# Patient Record
Sex: FEMALE | Race: WHITE | NOT HISPANIC OR LATINO | Employment: FULL TIME | URBAN - METROPOLITAN AREA
[De-identification: names, ages, dates, MRNs, and addresses within clinical notes are randomized per-mention and may not be internally consistent; named-entity substitution may affect disease eponyms.]

---

## 2018-01-11 NOTE — MISCELLANEOUS
Active Problems    1  Acute bronchitis (466 0) (J20 9)   2  Acute maxillary sinusitis (461 0) (J01 00)   3  Cigarette smoker (305 1) (F17 210)   4  Duodenal ulcer (532 90) (K26 9)   5  Dysfunction of eustachian tube, unspecified laterality (381 81) (H69 80)   6  Encounter for screening colonoscopy (V76 51) (Z12 11)   7  Encounter for screening mammogram for malignant neoplasm of breast (V76 12)   (Z12 31)   8  Gastritis (535 50) (K29 70)   9  Hyperlipidemia (272 4) (E78 5)   10  Nicotine dependence (305 1) (F17 200)   11  Screening for osteoporosis (V82 81) (U31 648)    Current Meds   1  Azithromycin 250 MG Oral Tablet; 2 tabs by mouth today then 1 tab by mouth daily for   the next 4 days; Therapy: 90ETO7080 to (Last Rx:05Nqk0731)  Requested for: 38RLJ7339 Ordered   2  Fluticasone Propionate 50 MCG/ACT Nasal Suspension; 2 sprays in each nostril at   bedtime; Therapy: 00RUN5289 to (Last Rx:37Bmd2448)  Requested for: 11LPT3158 Ordered    Allergies    1  Codeine Derivatives    Signatures   Electronically signed by :  AMADA Mack; Oct 25 2016  4:40PM EST                       (Author)

## 2018-01-15 NOTE — MISCELLANEOUS
Message  Return to work or school:   [employer] Kussmaul is under my professional care  She was seen in my office on 1/15/16    She is not able to return to work until 1/19/16      Michelle Madrid  Signatures   Electronically signed by :  AMADA Madrid; Germain 15 2016 11:03AM EST                       (Author)

## 2018-01-18 NOTE — PROGRESS NOTES
Assessment    1  Acute maxillary sinusitis (461 0) (J01 00)   2  Nicotine dependence (305 1) (F17 200)   3  Hyperlipidemia (272 4) (E78 5)    Plan  Acute maxillary sinusitis    · Amoxicillin 500 MG Oral Capsule; TAKE 2 CAPSULES TWICE DAILY UNTIL GONE   · Fluticasone Propionate 50 MCG/ACT Nasal Suspension; 2 sprays in each nostril at  bedtime   · Follow Up if Not Better Evaluation and Treatment  Follow-up  Status: Complete  Done:  03BBS4758   · Apply warm moist compresses to the affected area 3 times a day for 5 minutes ;  Status:Complete;   Done: 86SOR0848   · Drink at least 6 glasses of water or juice a day ; Status:Complete;   Done: 19IOF1957   · How to use a nasal spray ; Status:Complete;   Done: 26YNT5983   · Taking a hot steamy shower may help your condition ; Status:Complete;   Done:  31IVL8682   · Call [phone] if: The sinus pain is not better in 1 week ; Status:Complete;   Done:  86DPU7904   · Call [phone] if: The symptoms come back after the medications are finished ;  Status:Complete;   Done: 68CFC7172   · Call [phone] if: You start vomiting ; Status:Complete;   Done: 75LCY5415   · Call [phone] if: Your sinus pain is worse ; Status:Complete;   Done: 98LXU2744   · Call [phone] if: Your temperature is higher than 101F ; Status:Complete;   Done:  34KCI1949   · Seek Immediate Medical Attention if: You have a fever, headache, and vomiting, or have  a stiff neck ; Status:Complete;   Done: 58XUV1239   · Seek Immediate Medical Attention if: You have a severe headache that will not go away ;  Status:Complete;   Done: 56ISZ1820   · Seek Immediate Medical Attention if: You have signs of infection in or around the affected  area ; Status:Complete;   Done: 22YPR3595  Hyperlipidemia    · (1) CBC/PLT/DIFF; Status:Active; Requested for:15Jan2016;    · (1) COMPREHENSIVE METABOLIC PANEL; Status:Active;  Requested for:15Jan2016;    · (1) LIPID PANEL FASTING W DIRECT LDL REFLEX; Status:Active; Requested  for:15Jan2016;    · (1) T4, FREE; Status:Active; Requested for:15Jan2016;    · (1) TSH; Status:Active; Requested for:15Jan2016;    · (1) VITAMIN D 25-HYDROXY; Status:Active; Requested for:15Jan2016;   Nicotine dependence    · You need to quit smoking ; Status:Complete;   Done: 84LAI6468    Discussion/Summary    Acute sinusitis - given persistence/worsening, will issue antibiotic and nasal steroid, recommended she use mucinex and OTC cough med in addition as needed, urged smoking cessation    Hyperlipids - urged she schedule f/u appt to review fasting labs she is due for, lab orders given but defers appt at this time     History of Present Illness  HPI: States she and her son have been sick back and forth for past 1 5-2 weeks  She has been trying to doctor herself w/many OTC meds  Yesterday she started with head pounding on bending and increase in nasal drainage  Drainage has been yellow/green  Has headache over her eyes and has started with an awful cough w/occ SOB/wheeze  Slept all night last night  She still smokes  Insurance declined chantix so she tried the patch which made her break out  Review of Systems    Constitutional: feeling poorly, but no fever  ENT: nasal discharge, but no earache and no sore throat  Respiratory: shortness of breath, cough and wheezing  Active Problems    1  Cigarette smoker (305 1) (F17 210)   2  Duodenal ulcer (532 90) (K26 9)   3  Dysfunction of eustachian tube, unspecified laterality (381 81) (H69 80)   4  Encounter for screening colonoscopy (V76 51) (Z12 11)   5  Encounter for screening mammogram for malignant neoplasm of breast (V76 12)   (Z12 31)   6  Gastritis (535 50) (K29 70)   7  Hyperlipidemia (272 4) (E78 5)   8  Nicotine dependence (305 1) (F17 200)   9  Screening for osteoporosis (V82 81) (N26 381)    Past Medical History    1  History of Abdominal pain, epigastric (789 06) (R10 13)   2   History of Abdominal pain, RUQ (789 01) (R10 11)   3  History of Acute maxillary sinusitis, recurrence not specified (461 0) (J01 00)   4  History of abnormal weight loss (V13 89) (Z87 898)   5  History of acute bronchitis (V12 69) (Z87 09)   6  History of low back pain (V13 59) (Z87 39)   7  History of Microscopic hematuria (599 72) (R31 2)  Active Problems And Past Medical History Reviewed: The active problems and past medical history were reviewed and updated today  Family History    1  Family history of Dementia   2  Family history of Duodenal Ulcer   3  Family history of Mother  At Age 72    1  Family history of Colon Cancer (V16 0)   5  Family history of Father  At Age 73   7  Family history of Lung Cancer (V16 1)    7  Family history of factor V Leiden deficiency (V18 3) (Z83 2)    Social History    · Caffeine Use   · Cigarette smoker (305 1) (F17 210)   · Current Every Day Smoker (305 1)    Surgical History    1  History of Complete Colonoscopy   2  History of Diagnostic Esophagogastroduodenoscopy   3  History of Diagnostic Esophagogastroduodenoscopy    Current Meds   1  Simvastatin 20 MG Oral Tablet; Take 1 tablet by mouth at bedtime; Therapy: 94Fxz0990 to (247 9296)  Requested for: 29Rmb1153; Last   Rx:02Zma1259 Ordered    The medication list was reviewed and updated today  Allergies    1  Codeine Derivatives    Vitals   Recorded: 04YBF2000 10:41AM   Temperature 98 4 F   Heart Rate 88   Respiration 14   Systolic 952   Diastolic 80   Height 5 ft 4 5 in   Weight 122 lb    BMI Calculated 20 62   BSA Calculated 1 6     Physical Exam    Constitutional   General appearance: Abnormal   appears tired  Eyes   Conjunctiva and lids: No swelling, erythema or discharge  Ears, Nose, Mouth, and Throat   External inspection of ears and nose: Normal     Otoscopic examination: Tympanic membranes translucent with normal light reflex  Canals patent without erythema  bilat frontal/maxillary sinus pain     Oropharynx: Normal with no erythema, edema, exudate or lesions  Pulmonary   Respiratory effort: No increased work of breathing or signs of respiratory distress  no cough  Auscultation of lungs: Abnormal   Auscultation of the lungs revealed decreased breath sounds diffusely  Cardiovascular   Palpation of heart: Normal PMI, no thrills  Auscultation of heart: Normal rate and rhythm, normal S1 and S2, without murmurs  Lymphatic   Palpation of lymph nodes in neck: No lymphadenopathy  Psychiatric   Mood and affect: Normal          Results/Data  PHQ-2 Adult Depression Screening 93HLQ6149 10:44AM User, Ahs     Test Name Result Flag Reference   PHQ-2 Adult Depression Score 1     Q1: 1, Q2: 0   PHQ-2 Adult Depression Screening Negative         Attending Note  Collaborating Physician Note: Collaborating Note: I agree with the Advanced Practitioner note  Signatures   Electronically signed by :  Stacy Rodriguez; Germain 15 2016  6:00PM EST                       (Author)    Electronically signed by : Lucy Contreras MD; Jan 17 2016  8:04PM EST                       (Co-author)

## 2018-12-12 ENCOUNTER — HOSPITAL ENCOUNTER (OUTPATIENT)
Dept: RADIOLOGY | Facility: HOSPITAL | Age: 58
Discharge: HOME | End: 2018-12-12
Attending: PODIATRIST
Payer: COMMERCIAL

## 2018-12-12 ENCOUNTER — TRANSCRIBE ORDERS (OUTPATIENT)
Dept: ADMINISTRATIVE | Facility: HOSPITAL | Age: 58
End: 2018-12-12

## 2018-12-12 DIAGNOSIS — M79.604 PAIN OF RIGHT LOWER EXTREMITY: Primary | ICD-10-CM

## 2018-12-12 DIAGNOSIS — M79.604 PAIN OF RIGHT LOWER EXTREMITY: ICD-10-CM

## 2018-12-12 PROCEDURE — 73630 X-RAY EXAM OF FOOT: CPT

## 2019-04-22 DIAGNOSIS — Z12.39 ENCOUNTER FOR SCREENING FOR MALIGNANT NEOPLASM OF BREAST: Primary | ICD-10-CM

## 2019-08-26 ENCOUNTER — TELEPHONE (OUTPATIENT)
Dept: FAMILY MEDICINE CLINIC | Facility: HOSPITAL | Age: 59
End: 2019-08-26

## 2019-09-09 ENCOUNTER — TELEPHONE (OUTPATIENT)
Dept: FAMILY MEDICINE CLINIC | Facility: HOSPITAL | Age: 59
End: 2019-09-09

## 2019-10-21 ENCOUNTER — OFFICE VISIT (OUTPATIENT)
Dept: FAMILY MEDICINE CLINIC | Facility: HOSPITAL | Age: 59
End: 2019-10-21
Payer: COMMERCIAL

## 2019-10-21 VITALS
OXYGEN SATURATION: 99 % | SYSTOLIC BLOOD PRESSURE: 140 MMHG | HEART RATE: 100 BPM | WEIGHT: 135.2 LBS | HEIGHT: 64 IN | TEMPERATURE: 99.5 F | DIASTOLIC BLOOD PRESSURE: 80 MMHG | BODY MASS INDEX: 23.08 KG/M2

## 2019-10-21 DIAGNOSIS — R03.0 BORDERLINE BLOOD PRESSURE: ICD-10-CM

## 2019-10-21 DIAGNOSIS — Z13.220 SCREENING CHOLESTEROL LEVEL: ICD-10-CM

## 2019-10-21 DIAGNOSIS — Z13.29 THYROID DISORDER SCREENING: ICD-10-CM

## 2019-10-21 DIAGNOSIS — Z12.11 SCREENING FOR COLON CANCER: ICD-10-CM

## 2019-10-21 DIAGNOSIS — Z23 ENCOUNTER FOR IMMUNIZATION: ICD-10-CM

## 2019-10-21 DIAGNOSIS — Z00.00 ANNUAL PHYSICAL EXAM: Primary | ICD-10-CM

## 2019-10-21 DIAGNOSIS — Z80.0 FAMILY HISTORY OF MALIGNANT NEOPLASM OF COLON: ICD-10-CM

## 2019-10-21 PROCEDURE — 99396 PREV VISIT EST AGE 40-64: CPT | Performed by: NURSE PRACTITIONER

## 2019-10-21 NOTE — ASSESSMENT & PLAN NOTE
Briefly discussed/recommended cessation but pt pre-contemplative  Offered further assistance should she desire

## 2019-10-21 NOTE — PROGRESS NOTES
Sesar Garrison MD    NAME: John Peña  AGE: 63 y o  SEX: female  : 1960     DATE: 10/21/2019     Assessment and Plan:     Problem List Items Addressed This Visit        Other    Borderline blood pressure     Advised she manage w/lifestyle through diet and exercise efforts  Return in 1 year for BP check  Relevant Orders    CBC and differential    Comprehensive metabolic panel      Other Visit Diagnoses     Annual physical exam    -  Primary    PE updated, return in 1 year for next; orders given to update fasting labs and will call w/results; flu shot declined; she plans to do mammo in April    Family history of malignant neoplasm of colon        Relevant Orders    Ambulatory referral to Gastroenterology    Screening for colon cancer        she plans to schedule colonoscopy in April    Relevant Orders    Ambulatory referral to Gastroenterology    Screening cholesterol level        Relevant Orders    Lipid panel    Thyroid disorder screening        Relevant Orders    TSH, 3rd generation with Free T4 reflex    Encounter for immunization        Relevant Orders    influenza vaccine, [id number], quadrivalent, recombinant, PF, 0 5 mL, for patients 18 yr+ (FLUBLOK)          Immunizations and preventive care screenings were discussed with patient today  Appropriate education was printed on patient's after visit summary  Counseling:  · Exercise: the importance of regular exercise/physical activity was discussed  Recommend exercise 3-5 times per week for at least 30 minutes  Tobacco Cessation Counseling: Tobacco cessation counseling was provided   The patient is sincerely urged to quit consumption of tobacco  She is not ready to quit tobacco        Return in 1 year (on 10/21/2020) for Annual physical      Chief Complaint:     Chief Complaint   Patient presents with    Annual Exam      History of Present Illness:     Adult Annual Physical   Patient here for a comprehensive physical exam  The patient reports no problems  She knows she is overdue for endoscopy and needs colonoscopy  Will plan to do in April when she has more vacation time  Feeling down now with losing close family friend yesterday  Has a hard time living with   Diet and Physical Activity  · Diet/Nutrition: well balanced diet and low carb diet  · Exercise: walking  Depression Screening  PHQ-9 Depression Screening    PHQ-9:    Frequency of the following problems over the past two weeks:       Little interest or pleasure in doing things:  0 - not at all  Feeling down, depressed, or hopeless:  1 - several days  PHQ-2 Score:  1       General Health  · Sleep: sleeps well  · Hearing: normal - bilateral   · Vision: wears glasses  · Dental: regular dental visits  /GYN Health  · Patient is: postmenopausal  · Last menstrual period: ?  · Contraceptive method: post menopausal      Review of Systems:     Review of Systems   Constitutional: Negative for activity change, appetite change, fatigue and unexpected weight change  HENT: Negative for dental problem, ear pain, hearing loss, sore throat and trouble swallowing  Respiratory: Negative for cough, shortness of breath and wheezing  Cardiovascular: Negative for chest pain and palpitations  Gastrointestinal: Negative for abdominal pain, blood in stool, constipation and diarrhea  Genitourinary: Negative for dysuria  Psychiatric/Behavioral: Positive for dysphoric mood (currently due to grief)  Past Medical History:     Past Medical History:   Diagnosis Date    Abnormal weight loss     Last assessed 10/4/2013     Microscopic hematuria     Last assessed 5/29/2013       Past Surgical History:     Past Surgical History:   Procedure Laterality Date    COLONOSCOPY      Resolved 2/2013     ESOPHAGOGASTRODUODENOSCOPY      Diagnostic   Resolved May 2013       Social History:     Social History Socioeconomic History    Marital status: /Civil Union     Spouse name: None    Number of children: None    Years of education: None    Highest education level: None   Occupational History    None   Social Needs    Financial resource strain: None    Food insecurity:     Worry: None     Inability: None    Transportation needs:     Medical: None     Non-medical: None   Tobacco Use    Smoking status: Current Every Day Smoker    Smokeless tobacco: Never Used   Substance and Sexual Activity    Alcohol use: None    Drug use: None    Sexual activity: None   Lifestyle    Physical activity:     Days per week: None     Minutes per session: None    Stress: None   Relationships    Social connections:     Talks on phone: None     Gets together: None     Attends [employer] service: None     Active member of club or organization: None     Attends meetings of clubs or organizations: None     Relationship status: None    Intimate partner violence:     Fear of current or ex partner: None     Emotionally abused: None     Physically abused: None     Forced sexual activity: None   Other Topics Concern    None   Social History Narrative    Caffeine use       Family History:     Family History   Problem Relation Age of Onset    Dementia Mother     Other Mother         Duodenal ulcer     Colon cancer Father     Lung cancer Father     Factor V Leiden deficiency Sister       Current Medications:     No current outpatient medications on file  No current facility-administered medications for this visit  Allergies: Allergies   Allergen Reactions    Codeine Vomiting      Physical Exam:     /80 (Patient Position: Sitting, Cuff Size: Standard)   Pulse 100   Temp 99 5 °F (37 5 °C) (Tympanic)   Ht 5' 4" (1 626 m)   Wt 61 3 kg (135 lb 3 2 oz)   SpO2 99%   BMI 23 21 kg/m²     Physical Exam   Constitutional: She is oriented to person, place, and time   She appears well-developed and well-nourished  No distress  HENT:   Head: Normocephalic and atraumatic  Right Ear: Hearing and tympanic membrane normal    Left Ear: Hearing and tympanic membrane normal    Nose: Nose normal    Mouth/Throat: Oropharynx is clear and moist  No oropharyngeal exudate  Eyes: Conjunctivae are normal  No scleral icterus  Neck: Normal range of motion  Neck supple  No thyromegaly present  Cardiovascular: Normal rate, regular rhythm and normal heart sounds  No murmur heard  Pulmonary/Chest: Effort normal and breath sounds normal  No respiratory distress  Abdominal: Soft  Bowel sounds are normal  She exhibits no mass  There is no hepatosplenomegaly  There is no tenderness  There is no rebound and no guarding  Musculoskeletal: Normal range of motion  She exhibits no edema  Lymphadenopathy:     She has no cervical adenopathy  Neurological: She is alert and oriented to person, place, and time  No cranial nerve deficit  Skin: Skin is warm and dry  No rash noted  Psychiatric: She has a normal mood and affect  Her behavior is normal  Judgment and thought content normal    Vitals reviewed        Landon Hernandez MD

## 2019-10-21 NOTE — PATIENT INSTRUCTIONS
Wellness Visit for Adults   AMBULATORY CARE:   A wellness visit  is when you see your healthcare provider to get screened for health problems  You can also get advice on how to stay healthy  Write down your questions so you remember to ask them  Ask your healthcare provider how often you should have a wellness visit  What happens at a wellness visit:  Your healthcare provider will ask about your health, and your family history of health problems  This includes high blood pressure, heart disease, and cancer  He or she will ask if you have symptoms that concern you, if you smoke, and about your mood  You may also be asked about your intake of medicines, supplements, food, and alcohol  Any of the following may be done:  · Your weight  will be checked  Your height may also be checked so your body mass index (BMI) can be calculated  Your BMI shows if you are at a healthy weight  · Your blood pressure  and heart rate will be checked  Your temperature may also be checked  · Blood and urine tests  may be done  Blood tests may be done to check your cholesterol levels  Abnormal cholesterol levels increase your risk for heart disease and stroke  You may also need a blood or urine test to check for diabetes if you are at increased risk  Urine tests may be done to look for signs of an infection or kidney disease  · A physical exam  includes checking your heartbeat and lungs with a stethoscope  Your healthcare provider may also check your skin to look for sun damage  · Screening tests  may be recommended  A screening test is done to check for diseases that may not cause symptoms  The screening tests you may need depend on your age, gender, family history, and lifestyle habits  For example, colorectal screening may be recommended if you are 48years old or older  Screening tests you need if you are a woman:   · A Pap smear  is used to screen for cervical cancer   Pap smears are usually done every 3 to 5 years depending on your age  You may need them more often if you have had abnormal Pap smear test results in the past  Ask your healthcare provider how often you should have a Pap smear  · A mammogram  is an x-ray of your breasts to screen for breast cancer  Experts recommend mammograms every 2 years starting at age 48 years  You may need a mammogram at age 52 years or younger if you have an increased risk for breast cancer  Talk to your healthcare provider about when you should start having mammograms and how often you need them  Vaccines you may need:   · Get an influenza vaccine  every year  The influenza vaccine protects you from the flu  Several types of viruses cause the flu  The viruses change over time, so new vaccines are made each year  · Get a tetanus-diphtheria (Td) booster vaccine  every 10 years  This vaccine protects you against tetanus and diphtheria  Tetanus is a severe infection that may cause painful muscle spasms and lockjaw  Diphtheria is a severe bacterial infection that causes a thick covering in the back of your mouth and throat  · Get a human papillomavirus (HPV) vaccine  if you are female and aged 23 to 32 or male 23 to 24 and never received it  This vaccine protects you from HPV infection  HPV is the most common infection spread by sexual contact  HPV may also cause vaginal, penile, and anal cancers  · Get a pneumococcal vaccine  if you are aged 72 years or older  The pneumococcal vaccine is an injection given to protect you from pneumococcal disease  Pneumococcal disease is an infection caused by pneumococcal bacteria  The infection may cause pneumonia, meningitis, or an ear infection  · Get a shingles vaccine  if you are aged 61 or older, even if you have had shingles before  The shingles vaccine is an injection to protect you from the varicella-zoster virus  This is the same virus that causes chickenpox   Shingles is a painful rash that develops in people who had chickenpox or have been exposed to the virus  How to eat healthy:  My Plate is a model for planning healthy meals  It shows the types and amounts of foods that should go on your plate  Fruits and vegetables make up about half of your plate, and grains and protein make up the other half  A serving of dairy is included on the side of your plate  The amount of calories and serving sizes you need depends on your age, gender, weight, and height  Examples of healthy foods are listed below:  · Eat a variety of vegetables  such as dark green, red, and orange vegetables  You can also include canned vegetables low in sodium (salt) and frozen vegetables without added butter or sauces  · Eat a variety of fresh fruits , canned fruit in 100% juice, frozen fruit, and dried fruit  · Include whole grains  At least half of the grains you eat should be whole grains  Examples include whole-wheat bread, wheat pasta, brown rice, and whole-grain cereals such as oatmeal     · Eat a variety of protein foods such as seafood (fish and shellfish), lean meat, and poultry without skin (turkey and chicken)  Examples of lean meats include pork leg, shoulder, or tenderloin, and beef round, sirloin, tenderloin, and extra lean ground beef  Other protein foods include eggs and egg substitutes, beans, peas, soy products, nuts, and seeds  · Choose low-fat dairy products such as skim or 1% milk or low-fat yogurt, cheese, and cottage cheese  · Limit unhealthy fats  such as butter, hard margarine, and shortening  Exercise:  Exercise at least 30 minutes per day on most days of the week  Some examples of exercise include walking, biking, dancing, and swimming  You can also fit in more physical activity by taking the stairs instead of the elevator or parking farther away from stores  Include muscle strengthening activities 2 days each week  Regular exercise provides many health benefits   It helps you manage your weight, and decreases your risk for type 2 diabetes, heart disease, stroke, and high blood pressure  Exercise can also help improve your mood  Ask your healthcare provider about the best exercise plan for you  General health and safety guidelines:   · Do not smoke  Nicotine and other chemicals in cigarettes and cigars can cause lung damage  Ask your healthcare provider for information if you currently smoke and need help to quit  E-cigarettes or smokeless tobacco still contain nicotine  Talk to your healthcare provider before you use these products  · Limit alcohol  A drink of alcohol is 12 ounces of beer, 5 ounces of wine, or 1½ ounces of liquor  · Lose weight, if needed  Being overweight increases your risk of certain health conditions  These include heart disease, high blood pressure, type 2 diabetes, and certain types of cancer  · Protect your skin  Do not sunbathe or use tanning beds  Use sunscreen with a SPF 15 or higher  Apply sunscreen at least 15 minutes before you go outside  Reapply sunscreen every 2 hours  Wear protective clothing, hats, and sunglasses when you are outside  · Drive safely  Always wear your seatbelt  Make sure everyone in your car wears a seatbelt  A seatbelt can save your life if you are in an accident  Do not use your cell phone when you are driving  This could distract you and cause an accident  Pull over if you need to make a call or send a text message  · Practice safe sex  Use latex condoms if are sexually active and have more than one partner  Your healthcare provider may recommend screening tests for sexually transmitted infections (STIs)  · Wear helmets, lifejackets, and protective gear  Always wear a helmet when you ride a bike or motorcycle, go skiing, or play sports that could cause a head injury  Wear protective equipment when you play sports  Wear a lifejacket when you are on a boat or doing water sports    © 2017 [employer] Information is for End User's use only and may not be sold, redistributed or otherwise used for commercial purposes  All illustrations and images included in CareNotes® are the copyrighted property of [employer]  or [employer]  The above information is an  only  It is not intended as medical advice for individual conditions or treatments  Talk to your doctor, nurse or pharmacist before following any medical regimen to see if it is safe and effective for you  Cigarette Smoking and Your Health   AMBULATORY CARE:   Risks to your health if you smoke:  Nicotine and other chemicals found in tobacco damage every cell in your body  Even if you are a light smoker, you have an increased risk for cancer, heart disease, and lung disease  If you are pregnant or have diabetes, smoking increases your risk for complications  Benefits to your health if you stop smoking:   · You decrease respiratory symptoms such as coughing, wheezing, and shortness of breath  · You reduce your risk for cancers of the lung, mouth, throat, kidney, bladder, pancreas, stomach, and cervix  If you already have cancer, you increase the benefits of chemotherapy  You also reduce your risk for cancer returning or a second cancer from developing  · You reduce your risk for heart disease, blood clots, heart attack, and stroke  · You reduce your risk for lung infections, and diseases such as pneumonia, asthma, chronic bronchitis, and emphysema  · Your circulation improves  More oxygen can be delivered to your body  If you have diabetes, you lower your risk for complications, such as kidney, artery, and eye diseases  You also lower your risk for nerve damage  Nerve damage can lead to amputations, poor vision, and blindness  · You improve your body's ability to heal and to fight infections  Benefits to the health of others if you stop smoking:  Tobacco is harmful to nonsmokers who breathe in your secondhand smoke   The following are ways the health of others around you may improve when you stop smoking:  · You lower the risks for lung cancer and heart disease in nonsmoking adults  · If you are pregnant, you lower the risk for miscarriage, early delivery, low birth weight, and stillbirth  You also lower your baby's risk for SIDS, obesity, developmental delay, and neurobehavioral problems, such as ADHD  · If you have children, you lower their risk for ear infections, colds, pneumonia, bronchitis, and asthma  For more information and support to stop smoking:   · [employer]  Phone: 8- 397 - 254-3292  Web Address: www [facility]  Follow up with your healthcare provider as directed:  Write down your questions so you remember to ask them during your visits  © 2017 [address] [facility] Information is for End User's use only and may not be sold, redistributed or otherwise used for commercial purposes  All illustrations and images included in CareNotes® are the copyrighted property of [employer]  or [employer]  The above information is an  only  It is not intended as medical advice for individual conditions or treatments  Talk to your doctor, nurse or pharmacist before following any medical regimen to see if it is safe and effective for you

## 2020-10-26 ENCOUNTER — OFFICE VISIT (OUTPATIENT)
Dept: FAMILY MEDICINE CLINIC | Facility: HOSPITAL | Age: 60
End: 2020-10-26
Payer: COMMERCIAL

## 2020-10-26 VITALS
HEART RATE: 82 BPM | WEIGHT: 132.8 LBS | TEMPERATURE: 98.8 F | HEIGHT: 64 IN | BODY MASS INDEX: 22.67 KG/M2 | DIASTOLIC BLOOD PRESSURE: 78 MMHG | OXYGEN SATURATION: 100 % | SYSTOLIC BLOOD PRESSURE: 124 MMHG

## 2020-10-26 DIAGNOSIS — Z12.31 ENCOUNTER FOR SCREENING MAMMOGRAM FOR MALIGNANT NEOPLASM OF BREAST: ICD-10-CM

## 2020-10-26 DIAGNOSIS — F17.210 CIGARETTE NICOTINE DEPENDENCE WITHOUT COMPLICATION: ICD-10-CM

## 2020-10-26 DIAGNOSIS — Z00.00 ANNUAL PHYSICAL EXAM: Primary | ICD-10-CM

## 2020-10-26 DIAGNOSIS — E78.5 HYPERLIPIDEMIA, UNSPECIFIED HYPERLIPIDEMIA TYPE: ICD-10-CM

## 2020-10-26 DIAGNOSIS — Z12.11 ENCOUNTER FOR SCREENING COLONOSCOPY: ICD-10-CM

## 2020-10-26 PROCEDURE — 3725F SCREEN DEPRESSION PERFORMED: CPT | Performed by: NURSE PRACTITIONER

## 2020-10-26 PROCEDURE — 99396 PREV VISIT EST AGE 40-64: CPT | Performed by: NURSE PRACTITIONER

## 2020-10-26 PROCEDURE — 3008F BODY MASS INDEX DOCD: CPT | Performed by: NURSE PRACTITIONER

## 2021-04-14 ENCOUNTER — IMMUNIZATIONS (OUTPATIENT)
Dept: FAMILY MEDICINE CLINIC | Facility: HOSPITAL | Age: 61
End: 2021-04-14

## 2021-04-14 DIAGNOSIS — Z23 ENCOUNTER FOR IMMUNIZATION: Primary | ICD-10-CM

## 2021-04-14 PROCEDURE — 0001A SARS-COV-2 / COVID-19 MRNA VACCINE (PFIZER-BIONTECH) 30 MCG: CPT

## 2021-04-14 PROCEDURE — 91300 SARS-COV-2 / COVID-19 MRNA VACCINE (PFIZER-BIONTECH) 30 MCG: CPT

## 2021-05-08 ENCOUNTER — IMMUNIZATIONS (OUTPATIENT)
Dept: FAMILY MEDICINE CLINIC | Facility: HOSPITAL | Age: 61
End: 2021-05-08

## 2021-05-08 DIAGNOSIS — Z23 ENCOUNTER FOR IMMUNIZATION: Primary | ICD-10-CM

## 2021-05-08 PROCEDURE — 0002A SARS-COV-2 / COVID-19 MRNA VACCINE (PFIZER-BIONTECH) 30 MCG: CPT | Performed by: NURSE PRACTITIONER

## 2021-05-08 PROCEDURE — 91300 SARS-COV-2 / COVID-19 MRNA VACCINE (PFIZER-BIONTECH) 30 MCG: CPT | Performed by: NURSE PRACTITIONER

## 2021-10-28 LAB
ALBUMIN SERPL-MCNC: 4.2 G/DL (ref 3.6–5.1)
ALBUMIN/GLOB SERPL: 1.6 (CALC) (ref 1–2.5)
ALP SERPL-CCNC: 90 U/L (ref 37–153)
ALT SERPL-CCNC: 9 U/L (ref 6–29)
AST SERPL-CCNC: 13 U/L (ref 10–35)
BASOPHILS # BLD AUTO: 98 CELLS/UL (ref 0–200)
BASOPHILS NFR BLD AUTO: 1.4 %
BILIRUB SERPL-MCNC: 0.5 MG/DL (ref 0.2–1.2)
BUN SERPL-MCNC: 6 MG/DL (ref 7–25)
BUN/CREAT SERPL: 9 (CALC) (ref 6–22)
CALCIUM SERPL-MCNC: 9.4 MG/DL (ref 8.6–10.4)
CHLORIDE SERPL-SCNC: 99 MMOL/L (ref 98–110)
CHOLEST SERPL-MCNC: 248 MG/DL
CHOLEST/HDLC SERPL: 4.4 (CALC)
CO2 SERPL-SCNC: 28 MMOL/L (ref 20–32)
CREAT SERPL-MCNC: 0.64 MG/DL (ref 0.5–0.99)
EOSINOPHIL # BLD AUTO: 168 CELLS/UL (ref 15–500)
EOSINOPHIL NFR BLD AUTO: 2.4 %
ERYTHROCYTE [DISTWIDTH] IN BLOOD BY AUTOMATED COUNT: 13 % (ref 11–15)
GLOBULIN SER CALC-MCNC: 2.6 G/DL (CALC) (ref 1.9–3.7)
GLUCOSE SERPL-MCNC: 81 MG/DL (ref 65–99)
HCT VFR BLD AUTO: 40.7 % (ref 35–45)
HDLC SERPL-MCNC: 56 MG/DL
HGB BLD-MCNC: 13.4 G/DL (ref 11.7–15.5)
LDLC SERPL CALC-MCNC: 168 MG/DL (CALC)
LYMPHOCYTES # BLD AUTO: 2793 CELLS/UL (ref 850–3900)
LYMPHOCYTES NFR BLD AUTO: 39.9 %
MCH RBC QN AUTO: 29 PG (ref 27–33)
MCHC RBC AUTO-ENTMCNC: 32.9 G/DL (ref 32–36)
MCV RBC AUTO: 88.1 FL (ref 80–100)
MONOCYTES # BLD AUTO: 651 CELLS/UL (ref 200–950)
MONOCYTES NFR BLD AUTO: 9.3 %
NEUTROPHILS # BLD AUTO: 3290 CELLS/UL (ref 1500–7800)
NEUTROPHILS NFR BLD AUTO: 47 %
NONHDLC SERPL-MCNC: 192 MG/DL (CALC)
PLATELET # BLD AUTO: 389 THOUSAND/UL (ref 140–400)
PMV BLD REES-ECKER: 9.4 FL (ref 7.5–12.5)
POTASSIUM SERPL-SCNC: 4.3 MMOL/L (ref 3.5–5.3)
PROT SERPL-MCNC: 6.8 G/DL (ref 6.1–8.1)
RBC # BLD AUTO: 4.62 MILLION/UL (ref 3.8–5.1)
SL AMB EGFR AFRICAN AMERICAN: 112 ML/MIN/1.73M2
SL AMB EGFR NON AFRICAN AMERICAN: 97 ML/MIN/1.73M2
SODIUM SERPL-SCNC: 134 MMOL/L (ref 135–146)
TRIGL SERPL-MCNC: 112 MG/DL
TSH SERPL-ACNC: 1.91 MIU/L (ref 0.4–4.5)
WBC # BLD AUTO: 7 THOUSAND/UL (ref 3.8–10.8)

## 2021-11-01 ENCOUNTER — OFFICE VISIT (OUTPATIENT)
Dept: FAMILY MEDICINE CLINIC | Facility: HOSPITAL | Age: 61
End: 2021-11-01
Payer: COMMERCIAL

## 2021-11-01 VITALS
BODY MASS INDEX: 22.32 KG/M2 | WEIGHT: 126 LBS | HEIGHT: 63 IN | HEART RATE: 86 BPM | SYSTOLIC BLOOD PRESSURE: 118 MMHG | TEMPERATURE: 99 F | DIASTOLIC BLOOD PRESSURE: 80 MMHG | OXYGEN SATURATION: 100 %

## 2021-11-01 DIAGNOSIS — Z11.59 NEED FOR HEPATITIS C SCREENING TEST: ICD-10-CM

## 2021-11-01 DIAGNOSIS — F17.210 CIGARETTE NICOTINE DEPENDENCE WITHOUT COMPLICATION: ICD-10-CM

## 2021-11-01 DIAGNOSIS — Z00.00 ANNUAL PHYSICAL EXAM: Primary | ICD-10-CM

## 2021-11-01 DIAGNOSIS — Z83.2 FAMILY HISTORY OF CLOTTING DISORDER: ICD-10-CM

## 2021-11-01 DIAGNOSIS — Z11.4 ENCOUNTER FOR SCREENING FOR HIV: ICD-10-CM

## 2021-11-01 DIAGNOSIS — E78.5 HYPERLIPIDEMIA, UNSPECIFIED HYPERLIPIDEMIA TYPE: ICD-10-CM

## 2021-11-01 PROCEDURE — 99396 PREV VISIT EST AGE 40-64: CPT | Performed by: NURSE PRACTITIONER

## 2022-03-22 ENCOUNTER — HOSPITAL ENCOUNTER (OUTPATIENT)
Dept: RADIOLOGY | Facility: HOSPITAL | Age: 62
Discharge: HOME | End: 2022-03-22
Payer: COMMERCIAL

## 2022-03-22 ENCOUNTER — OFFICE VISIT (OUTPATIENT)
Dept: FAMILY MEDICINE CLINIC | Facility: HOSPITAL | Age: 62
End: 2022-03-22
Payer: COMMERCIAL

## 2022-03-22 ENCOUNTER — TELEPHONE (OUTPATIENT)
Dept: FAMILY MEDICINE CLINIC | Facility: HOSPITAL | Age: 62
End: 2022-03-22

## 2022-03-22 VITALS
TEMPERATURE: 98.5 F | HEART RATE: 86 BPM | SYSTOLIC BLOOD PRESSURE: 122 MMHG | BODY MASS INDEX: 22.15 KG/M2 | OXYGEN SATURATION: 99 % | HEIGHT: 63 IN | DIASTOLIC BLOOD PRESSURE: 78 MMHG | WEIGHT: 125 LBS

## 2022-03-22 DIAGNOSIS — M79.604 RIGHT LEG PAIN: ICD-10-CM

## 2022-03-22 DIAGNOSIS — M79.604 RIGHT LEG PAIN: Primary | ICD-10-CM

## 2022-03-22 DIAGNOSIS — Z12.31 SCREENING MAMMOGRAM FOR BREAST CANCER: ICD-10-CM

## 2022-03-22 PROCEDURE — 4004F PT TOBACCO SCREEN RCVD TLK: CPT | Performed by: NURSE PRACTITIONER

## 2022-03-22 PROCEDURE — 3008F BODY MASS INDEX DOCD: CPT | Performed by: NURSE PRACTITIONER

## 2022-03-22 PROCEDURE — 99214 OFFICE O/P EST MOD 30 MIN: CPT | Performed by: NURSE PRACTITIONER

## 2022-03-22 PROCEDURE — 73502 X-RAY EXAM HIP UNI 2-3 VIEWS: CPT

## 2022-03-22 RX ORDER — CYCLOBENZAPRINE HCL 5 MG
5 TABLET ORAL 3 TIMES DAILY PRN
Qty: 30 TABLET | Refills: 0 | Status: SHIPPED | OUTPATIENT
Start: 2022-03-22

## 2022-03-22 NOTE — LETTER
March 22, 2022     Patient: Mine Robles   YOB: 1960   Date of Visit: 3/22/2022       To Whom it May Concern:    Amisha Lopez is under my professional care  She was seen in my office on 3/22/2022  She may return to work on 3/23/2022  If you have any questions or concerns, please don't hesitate to call           Sincerely,          AMADA Strong        CC: No Recipients

## 2022-03-22 NOTE — PROGRESS NOTES
Assessment/Plan:    No problem-specific Assessment & Plan notes found for this encounter  Diagnoses and all orders for this visit:    Right leg pain  Comments:  sciatica vs piriformis syndrome - eval further w/x-ray & consult PT, use muscle relaxer & tylenol prn (she declined NSAID rx)  Orders:  -     XR hip/pelv 2-3 vws right if performed; Future  -     Ambulatory Referral to Physical Therapy; Future  -     cyclobenzaprine (FLEXERIL) 5 mg tablet; Take 1 tablet (5 mg total) by mouth 3 (three) times a day as needed for muscle spasms    Screening mammogram for breast cancer  -     Mammo screening bilateral w cad; Future      Return to update gyn exam/pap smear - discuss colon screening at that time  Update mammo as ordered    Subjective:      Patient ID: Shelia Arrieta is a 62 y o  female  Has had pain in right leg since injuring herself in the fall at work  She didn't report injury because pain had subsided  Pain has returned again since January when she slipped on ice  Has been applying biofreeze and taking ibuprofen  No relief w/ibuprofen  Feels pain most in right hip and buttock and top of leg  The following portions of the patient's history were reviewed and updated as appropriate: allergies, current medications, past family history, past medical history, past social history, past surgical history and problem list     Review of Systems   Musculoskeletal: Positive for arthralgias (right hip), gait problem and myalgias (right leg)  Negative for back pain  Objective:      /78 (Patient Position: Sitting, Cuff Size: Standard)   Pulse 86   Temp 98 5 °F (36 9 °C) (Tympanic)   Ht 5' 3 25" (1 607 m)   Wt 56 7 kg (125 lb)   SpO2 99%   BMI 21 97 kg/m²          Physical Exam  Vitals reviewed  Constitutional:       General: She is not in acute distress  Appearance: Normal appearance  HENT:      Head: Normocephalic     Pulmonary:      Effort: Pulmonary effort is normal  No respiratory distress  Musculoskeletal:      Right hip: Tenderness present  Normal range of motion  Right upper leg: Tenderness (right groin) present  Right lower leg: No edema  Legs:    Skin:     General: Skin is warm and dry  Neurological:      General: No focal deficit present  Mental Status: She is alert and oriented to person, place, and time  Motor: Motor function is intact  Gait: Gait abnormal (limping)        Comments: - SLR bilat   Psychiatric:         Mood and Affect: Mood normal          Behavior: Behavior normal

## 2022-03-22 NOTE — TELEPHONE ENCOUNTER
PATIENT SEEN TODAY - PATIENT STATES THAT RADHA WAS SUPPOSED TO SEND MUSCLE RELAXER TO  [facility] Esc[facility] - PHARMACY HAS NOT RECEIVED - PLEASE ADVISE

## 2022-05-16 ENCOUNTER — ANNUAL EXAM (OUTPATIENT)
Dept: FAMILY MEDICINE CLINIC | Facility: HOSPITAL | Age: 62
End: 2022-05-16
Payer: COMMERCIAL

## 2022-05-16 VITALS
WEIGHT: 122.8 LBS | SYSTOLIC BLOOD PRESSURE: 138 MMHG | HEART RATE: 103 BPM | DIASTOLIC BLOOD PRESSURE: 82 MMHG | TEMPERATURE: 98.3 F | BODY MASS INDEX: 21.76 KG/M2 | HEIGHT: 63 IN

## 2022-05-16 DIAGNOSIS — E28.39 MENOPAUSE OVARIAN FAILURE: ICD-10-CM

## 2022-05-16 DIAGNOSIS — Z01.419 ENCOUNTER FOR GYNECOLOGICAL EXAMINATION WITHOUT ABNORMAL FINDING: Primary | ICD-10-CM

## 2022-05-16 LAB — SL AMB POCT FECES OCC BLD: NEGATIVE

## 2022-05-16 PROCEDURE — 99214 OFFICE O/P EST MOD 30 MIN: CPT | Performed by: NURSE PRACTITIONER

## 2022-05-16 PROCEDURE — 82270 OCCULT BLOOD FECES: CPT | Performed by: NURSE PRACTITIONER

## 2022-05-16 PROCEDURE — 4004F PT TOBACCO SCREEN RCVD TLK: CPT | Performed by: NURSE PRACTITIONER

## 2022-05-16 PROCEDURE — 3725F SCREEN DEPRESSION PERFORMED: CPT | Performed by: NURSE PRACTITIONER

## 2022-05-16 PROCEDURE — 3008F BODY MASS INDEX DOCD: CPT | Performed by: NURSE PRACTITIONER

## 2022-05-16 NOTE — PROGRESS NOTES
Pt is a61 y o  postmenopausal female who presents for preventive care  Partner same ()  States they rarely have intercourse  Only complaint is bladder is getting weaker  Had 1 incontinent episode  + frequency     Colonoscopy: hasn't scheduled yet  Mammo: hasn't scheduled yet  DEXA: not yet, will order      Past Medical History:   Diagnosis Date    Abnormal weight loss     Last assessed 10/4/2013     Microscopic hematuria     Last assessed 2013        Past Surgical History:   Procedure Laterality Date    COLONOSCOPY      Resolved 2013     ESOPHAGOGASTRODUODENOSCOPY      Diagnostic   Resolved May 2013        Ob Hx:   OB History    Para Term  AB Living   1 1       1   SAB IAB Ectopic Multiple Live Births           1      # Outcome Date GA Lbr Juvenal/2nd Weight Sex Delivery Anes PTL Lv   1 Para                Gyn HX:  postmenopausal      Current Outpatient Medications:     Multiple Vitamins-Minerals (ONE-A-DAY WOMENS PO), Take by mouth, Disp: , Rfl:     cyclobenzaprine (FLEXERIL) 5 mg tablet, Take 1 tablet (5 mg total) by mouth 3 (three) times a day as needed for muscle spasms (Patient not taking: Reported on 2022), Disp: 30 tablet, Rfl: 0    Allergies   Allergen Reactions    Codeine Vomiting       Social History     Socioeconomic History    Marital status: /Civil Union     Spouse name: None    Number of children: None    Years of education: None    Highest education level: None   Occupational History    None   Tobacco Use    Smoking status: Current Every Day Smoker     Types: Cigarettes     Start date:     Smokeless tobacco: Never Used   Vaping Use    Vaping Use: Never used   Substance and Sexual Activity    Alcohol use: None    Drug use: None    Sexual activity: None   Other Topics Concern    None   Social History Narrative    Caffeine use      Social Determinants of Health     Financial Resource Strain: Not on file   Food Insecurity: Not on file Transportation Needs: Not on file   Physical Activity: Not on file   Stress: Not on file   Social Connections: Not on file   Intimate Partner Violence: Not on file   Housing Stability: Not on file       Family History   Problem Relation Age of Onset    Dementia Mother     Other Mother         Duodenal ulcer     Colon cancer Father     Lung cancer Father     Factor V Leiden deficiency Sister        Blood pressure 138/82, pulse 103, temperature 98 3 °F (36 8 °C), height 5' 3 25" (1 607 m), weight 55 7 kg (122 lb 12 8 oz)  Physical Exam  Vitals reviewed  Exam conducted with a chaperone present  Constitutional:       General: She is not in acute distress  Appearance: Normal appearance  HENT:      Head: Normocephalic  Eyes:      General: No scleral icterus  Cardiovascular:      Rate and Rhythm: Normal rate and regular rhythm  Heart sounds: No murmur heard  Pulmonary:      Effort: Pulmonary effort is normal  No respiratory distress  Breath sounds: Normal breath sounds  Comments: Dry cough  Chest:   Breasts: Breasts are symmetrical       Right: Normal  No axillary adenopathy  Left: Inverted nipple present  No axillary adenopathy  Abdominal:      General: Abdomen is flat  Palpations: Abdomen is soft  Tenderness: There is no abdominal tenderness  There is no guarding or rebound  Genitourinary:     General: Normal vulva  Rectum: Normal  Guaiac result negative  Musculoskeletal:      Right lower leg: No edema  Left lower leg: No edema  Lymphadenopathy:      Upper Body:      Right upper body: No axillary adenopathy  Left upper body: No axillary adenopathy  Skin:     General: Skin is warm and dry  Neurological:      General: No focal deficit present  Mental Status: She is alert and oriented to person, place, and time  Psychiatric:         Mood and Affect: Mood is anxious             vulva: normal external genitalia for age and atrophic changes  vagina: color pale and rugae  flattening of rugae  cervix: nullip and no lesions   uterus: anterior, non-tender  adnexa: no masses or tenderness  rectum: no masses or nodularity    A/P:  Pt is a 62 y o  postmenopausal female      Denver Sanders was seen today for gynecologic exam     Diagnoses and all orders for this visit:    Encounter for gynecological examination without abnormal finding  Comments:  CBE/pap/pelvic/rectal exams updated, return in 1 year for next; schedule mammo & dexa as ordered; update colonoscopy  Orders:  -     Thinprep Pap and HR HPV DNA; Future  -     Thinprep Pap and HR HPV DNA  -     POCT hemoccult screening    Menopause ovarian failure  -     DXA bone density spine hip and pelvis;  Future

## 2022-05-19 LAB
CLINICAL INFO: NORMAL
CYTO CVX: NORMAL
DATE PREVIOUS BX: NORMAL
HPV I/H RISK 1 DNA CVX QL PROBE+SIG AMP: NOT DETECTED
LMP START DATE: NORMAL
SL AMB PREV. PAP:: NORMAL
SPECIMEN SOURCE CVX/VAG CYTO: NORMAL

## 2023-10-31 ENCOUNTER — OFFICE VISIT (OUTPATIENT)
Dept: FAMILY MEDICINE CLINIC | Facility: HOSPITAL | Age: 63
End: 2023-10-31
Payer: COMMERCIAL

## 2023-10-31 VITALS
BODY MASS INDEX: 22.71 KG/M2 | HEART RATE: 80 BPM | SYSTOLIC BLOOD PRESSURE: 124 MMHG | WEIGHT: 128.2 LBS | DIASTOLIC BLOOD PRESSURE: 80 MMHG | HEIGHT: 63 IN | TEMPERATURE: 97.8 F

## 2023-10-31 DIAGNOSIS — M54.41 ACUTE BILATERAL LOW BACK PAIN WITH RIGHT-SIDED SCIATICA: Primary | ICD-10-CM

## 2023-10-31 DIAGNOSIS — Z12.31 ENCOUNTER FOR SCREENING MAMMOGRAM FOR MALIGNANT NEOPLASM OF BREAST: ICD-10-CM

## 2023-10-31 PROCEDURE — 99213 OFFICE O/P EST LOW 20 MIN: CPT | Performed by: NURSE PRACTITIONER

## 2023-10-31 RX ORDER — MELOXICAM 15 MG/1
15 TABLET ORAL DAILY
Qty: 30 TABLET | Refills: 0 | Status: SHIPPED | OUTPATIENT
Start: 2023-10-31

## 2023-10-31 RX ORDER — METHOCARBAMOL 500 MG/1
TABLET, FILM COATED ORAL
Qty: 30 TABLET | Refills: 0 | Status: SHIPPED | OUTPATIENT
Start: 2023-10-31

## 2023-10-31 NOTE — LETTER
October 31, 2023     Patient: Sandi Messer  YOB: 1960  Date of Visit: 10/31/2023      To Whom it May Concern:    Angelika Nunez is under my professional care. Devonte Mills was seen in my office on 10/31/2023. Devonte Mills may return to work on 11/1/2023 . If you have any questions or concerns, please don't hesitate to call.          Sincerely,          AMADA Arnold        CC: No Recipients

## 2023-10-31 NOTE — PROGRESS NOTES
Name: Jomar Tang      : 1960      MRN: [id number]  Encounter Provider: AMADA Amador  Encounter Date: 10/31/2023   Encounter department: [address]     1. Acute bilateral low back pain with right-sided sciatica  Comments:  start NSAID & muscle relaxer as rx'd, continue ice & heat compresses; call for PT referral if sx's persist/worsen  Orders:  -     meloxicam (Mobic) 15 mg tablet; Take 1 tablet (15 mg total) by mouth daily  -     methocarbamol (ROBAXIN) 500 mg tablet; Take 1 tablet TID prn for back pain    2. Encounter for screening mammogram for malignant neoplasm of breast  -     Mammo screening bilateral w 3d & cad; Future; Expected date: 10/31/2023        Depression Screening and Follow-up Plan: Patient was screened for depression during today's encounter. They screened negative with a PHQ-2 score of 0. Subjective        Has had low back pain bilaterally for 3 days. No injury known. Applied ice and heat yesterday and did less activity. Back feels better today. Taking ibuprofen a few times with omeprazole due to history of stomach issues. Hx of having right leg pain 2 years ago and resolved until recently it came back and is making her walk different. Review of Systems   Musculoskeletal:  Positive for back pain (bilat low back) and gait problem (due to right leg pain). Neurological:  Negative for weakness and numbness.        Current Outpatient Medications on File Prior to Visit   Medication Sig    Multiple Vitamins-Minerals (ONE-A-DAY WOMENS PO) Take by mouth    [DISCONTINUED] cyclobenzaprine (FLEXERIL) 5 mg tablet Take 1 tablet (5 mg total) by mouth 3 (three) times a day as needed for muscle spasms (Patient not taking: Reported on 2022)       Objective     /80   Pulse 80   Temp 97.8 °F (36.6 °C)   Ht 5' 3.25" (1.607 m)   Wt 58.2 kg (128 lb 3.2 oz)   BMI 22.53 kg/m²       Physical Exam  Vitals reviewed. Constitutional:       General: She is not in acute distress. Appearance: Normal appearance. HENT:      Head: Normocephalic. Pulmonary:      Effort: Pulmonary effort is normal. No respiratory distress. Musculoskeletal:      Lumbar back: Tenderness present. No swelling, deformity, spasms or bony tenderness. Decreased range of motion. No scoliosis. Back:    Skin:     General: Skin is warm and dry. Neurological:      General: No focal deficit present. Mental Status: She is alert and oriented to person, place, and time. Motor: No weakness. Gait: Gait abnormal (slow, antalgic).    Psychiatric:         Mood and Affect: Mood normal.         Behavior: Behavior normal.         AMADA Galindo

## 2024-02-16 ENCOUNTER — TELEPHONE (OUTPATIENT)
Dept: OBGYN CLINIC | Facility: CLINIC | Age: 64
End: 2024-02-16

## 2024-02-16 NOTE — TELEPHONE ENCOUNTER
LVM for patient to call and reschedule appointment from 2/20/2024 with Dr. Redman. Needs to be scheduled with Dr. Tucker. Provided number to call back.

## 2024-02-19 ENCOUNTER — TELEPHONE (OUTPATIENT)
Dept: OBGYN CLINIC | Facility: CLINIC | Age: 64
End: 2024-02-19

## 2024-02-19 NOTE — TELEPHONE ENCOUNTER
Spoke to son who will give mom message to reschedule appointment from Dr. Redman to Dr. Tucker. Provided number

## 2024-02-19 NOTE — TELEPHONE ENCOUNTER
Spoke to son who will give message to mom regarding rescheduling appointment with Dr. Redman to Dr. Tucker. Provided number.

## 2024-03-04 ENCOUNTER — OFFICE VISIT (OUTPATIENT)
Dept: OBGYN CLINIC | Facility: CLINIC | Age: 64
End: 2024-03-04
Payer: COMMERCIAL

## 2024-03-04 VITALS
DIASTOLIC BLOOD PRESSURE: 82 MMHG | HEIGHT: 65 IN | SYSTOLIC BLOOD PRESSURE: 124 MMHG | WEIGHT: 127 LBS | BODY MASS INDEX: 21.16 KG/M2

## 2024-03-04 DIAGNOSIS — M54.41 ACUTE RIGHT-SIDED LOW BACK PAIN WITH RIGHT-SIDED SCIATICA: Primary | ICD-10-CM

## 2024-03-04 DIAGNOSIS — M25.851 HIP IMPINGEMENT SYNDROME, RIGHT: ICD-10-CM

## 2024-03-04 DIAGNOSIS — M16.10 HIP ARTHRITIS: ICD-10-CM

## 2024-03-04 PROCEDURE — 99204 OFFICE O/P NEW MOD 45 MIN: CPT | Performed by: FAMILY MEDICINE

## 2024-03-04 NOTE — PATIENT INSTRUCTIONS
Trial right hip USG CSI  If no improvement or residual back and leg pain will consider mri of the lumbar spine

## 2024-03-04 NOTE — PROGRESS NOTES
1. Acute right-sided low back pain with right-sided sciatica   Physical Therapy      2. Hip impingement syndrome, right  [facility]      3. Hip arthritis   Physical Therapy        Orders Placed This Encounter   Procedures     Physical Therapy        IMAGING STUDIES: (I personally reviewed images in PACS and report):  Xray right hip  3/22/22:  Right Groin Pain  Right Hip JENNIFER and mild to mod OA      PAST REPORTS:        ASSESSMENT/PLAN:  Right Groin Pain  Right Hip JENNIFER   Chronic Right Hip mild to mod OA    Repeat X-ray next visit: None    Return for Follow-up for Ultrasound Guided Injection.    Patient instructions below verbally summarized in person during encounter:  Patient Instructions   Trial right hip [facility] CSI  If no improvement or residual back and leg pain will consider mri of the lumbar spine      __________________________________________________________________________    HISTORY OF PRESENT ILLNESS:      Right groin pain radiating down to the knee. Worse climbing stairs and rising from seated position. Ongoing for years. Mild but at times severe.     Associated symptoms low back    Denies numbness and tingling.     Physical Therapy: none    Denies any saddle anesthesia, new onset bowel/bladder incontinence, fevers, personal history of cancer, unintentional weight loss, weakness        Review of Systems      Following history reviewed and update:    Past Medical History:   Diagnosis Date    Abnormal weight loss     Last assessed 10/4/2013     Microscopic hematuria     Last assessed 5/29/2013      Past Surgical History:   Procedure Laterality Date    COLONOSCOPY      Resolved 2/2013     ESOPHAGOGASTRODUODENOSCOPY      Diagnostic. Resolved May 2013      Social History   Social History     Substance and Sexual Activity   Alcohol Use Yes     Social History     Substance and Sexual Activity   Drug Use Not Currently     Social History     Tobacco Use   Smoking Status Every Day    Types: Cigarettes     "Start date: 1977   Smokeless Tobacco Never     Family History   Problem Relation Age of Onset    Dementia Mother     Other Mother         Duodenal ulcer     Colon cancer Father     Lung cancer Father     Factor V Leiden deficiency Sister      Allergies   Allergen Reactions    Codeine Vomiting          Physical Exam  /82 (BP Location: Left arm, Patient Position: Sitting, Cuff Size: Standard)   Ht 5' 4.5\" (1.638 m)   Wt 57.6 kg (127 lb)   BMI 21.46 kg/m²         Ortho Exam    BACK EXAM:  Gait: normal    BACK TENDERNESS:  Spinous Processes: no  Paraspinal Muscles: no       DERMATOMAL SENSATION:  L1: normal   L2: normal   L3: normal   L4: normal   L5: normal   S1: normal    STRENGTH (bilateral):  Knee Extension: 5/5  Foot Dorsiflexion: 5/5  Great Toe Extension: 5/5  Foot Plantarflexion: 5/5  Hip Flexion: 5/5  Hip Abduction: 5/5      RIGHT HIP:  LOG ROLL: negative  SACHI: +  FADIR: +    LEFT HIP:  LOG ROLL: negative  SACHI: negative  FADIR: negative           __________________________________________________________________________  Procedures                  "

## 2024-03-08 ENCOUNTER — TELEPHONE (OUTPATIENT)
Age: 64
End: 2024-03-08

## 2024-03-08 DIAGNOSIS — M54.41 ACUTE BILATERAL LOW BACK PAIN WITH RIGHT-SIDED SCIATICA: ICD-10-CM

## 2024-03-08 RX ORDER — MELOXICAM 15 MG/1
15 TABLET ORAL DAILY
Qty: 30 TABLET | Refills: 0 | Status: SHIPPED | OUTPATIENT
Start: 2024-03-08

## 2024-03-08 NOTE — TELEPHONE ENCOUNTER
Caller: Patient    Doctor: Garrett    Reason for call: Patient saw Dr Tucker on 3/4, and was set up for US in April.  Patient is wondering if the doctor could temporarily prescribe a medication for the pain leading up to that injection, to [facility] [place].    Please call patient to advise if this request can be accommodated.    Call back#: [phone]

## 2024-03-08 NOTE — TELEPHONE ENCOUNTER
Patient notified.  Olga would like to be notified if there is a cancellation and her USGI apt for 4/10 could be moved up.

## 2024-03-12 ENCOUNTER — TELEPHONE (OUTPATIENT)
Dept: OBGYN CLINIC | Facility: HOSPITAL | Age: 64
End: 2024-03-12

## 2024-03-12 NOTE — TELEPHONE ENCOUNTER
Caller: Patient    Doctor: Garrett    Reason for call: Patient would like to move up USGI if possible.  She will be losing her ins on 3/31/24.  Please advise.    Call back#: [phone]

## 2024-03-22 ENCOUNTER — PROCEDURE VISIT (OUTPATIENT)
Dept: OBGYN CLINIC | Facility: OTHER | Age: 64
End: 2024-03-22
Payer: COMMERCIAL

## 2024-03-22 VITALS
HEIGHT: 65 IN | BODY MASS INDEX: 21.16 KG/M2 | WEIGHT: 127 LBS | DIASTOLIC BLOOD PRESSURE: 84 MMHG | SYSTOLIC BLOOD PRESSURE: 118 MMHG

## 2024-03-22 DIAGNOSIS — M25.851 HIP IMPINGEMENT SYNDROME, RIGHT: Primary | ICD-10-CM

## 2024-03-22 PROCEDURE — 20611 DRAIN/INJ JOINT/BURSA W/US: CPT | Performed by: FAMILY MEDICINE

## 2024-03-22 RX ORDER — TRIAMCINOLONE ACETONIDE 40 MG/ML
80 INJECTION, SUSPENSION INTRA-ARTICULAR; INTRAMUSCULAR
Status: COMPLETED | OUTPATIENT
Start: 2024-03-22 | End: 2024-03-22

## 2024-03-22 RX ORDER — BUPIVACAINE HYDROCHLORIDE 2.5 MG/ML
4 INJECTION, SOLUTION INFILTRATION; PERINEURAL
Status: COMPLETED | OUTPATIENT
Start: 2024-03-22 | End: 2024-03-22

## 2024-03-22 RX ORDER — BUPIVACAINE HYDROCHLORIDE 2.5 MG/ML
8 INJECTION, SOLUTION INFILTRATION; PERINEURAL
Status: COMPLETED | OUTPATIENT
Start: 2024-03-22 | End: 2024-03-22

## 2024-03-22 RX ADMIN — TRIAMCINOLONE ACETONIDE 80 MG: 40 INJECTION, SUSPENSION INTRA-ARTICULAR; INTRAMUSCULAR at 12:00

## 2024-03-22 RX ADMIN — BUPIVACAINE HYDROCHLORIDE 4 ML: 2.5 INJECTION, SOLUTION INFILTRATION; PERINEURAL at 12:00

## 2024-03-22 RX ADMIN — BUPIVACAINE HYDROCHLORIDE 8 ML: 2.5 INJECTION, SOLUTION INFILTRATION; PERINEURAL at 12:00

## 2024-03-22 NOTE — PATIENT INSTRUCTIONS
Trial right hip [facility]G CSI for hip impingement  Continue physical therapy for hip and back  Will consider mri lumbar spine next visit possible component of Lumbar radiculopathy

## 2024-03-22 NOTE — PROGRESS NOTES
"1. Hip impingement syndrome, right          Orders Placed This Encounter   Procedures    Large joint arthrocentesis        Xray report 3/22/22:  Mild right hip OA      ASSESSMENT/PLAN:  Right Groin Pain  Right Hip JENNIFER and mild to mod OA        Return in about 6 weeks (around 5/3/2024).    Patient instructions below verbally summarized in person during encounter:  Patient Instructions   Trial right hip [facility]G CSI for hip impingement  Continue physical therapy for hip and back  Will consider mri lumbar spine next visit possible component of Lumbar radiculopathy      __________________________________________________________________________    Past Medical History:   Diagnosis Date    Abnormal weight loss     Last assessed 10/4/2013     Microscopic hematuria     Last assessed 5/29/2013      Allergies   Allergen Reactions    Codeine Vomiting          There were no vitals taken for this visit.    __________________________________________________________________________  Large joint arthrocentesis: R hip joint  Universal Protocol:  Consent: Verbal consent obtained.  Risks and benefits: risks, benefits and alternatives were discussed  Consent given by: patient  Time out: Immediately prior to procedure a \"time out\" was called to verify the correct patient, procedure, equipment, support staff and site/side marked as required.  Patient understanding: patient states understanding of the procedure being performed  Site marked: the operative site was marked  Patient identity confirmed: verbally with patient  Supporting Documentation  Indications: pain and diagnostic evaluation   Procedure Details  Location: hip - R hip joint  Preparation: Patient was prepped and draped in the usual sterile fashion  Needle size: 22 G  Ultrasound guidance: yes  Approach: anterior  Medications administered: 8 mL bupivacaine 0.25 %; 4 mL bupivacaine 0.25 %; 80 mg triamcinolone acetonide 40 mg/mL    Aspirate amount: 0 mL  Patient tolerance: patient " tolerated the procedure well with no immediate complications  Dressing:  Sterile dressing applied